# Patient Record
Sex: FEMALE | Employment: FULL TIME | RURAL
[De-identification: names, ages, dates, MRNs, and addresses within clinical notes are randomized per-mention and may not be internally consistent; named-entity substitution may affect disease eponyms.]

---

## 2020-06-12 LAB
ANTIBODY SCREEN, EXTERNAL: NORMAL
CHLAMYDIA, EXTERNAL: NORMAL
GRBS, EXTERNAL: NORMAL
HBSAG, EXTERNAL: NORMAL
HIV, EXTERNAL: NON REACTIVE
N. GONORRHEA, EXTERNAL: NORMAL
RUBELLA, EXTERNAL: NORMAL
T. PALLIDUM, EXTERNAL: NON REACTIVE
TYPE, ABO & RH, EXTERNAL: NORMAL

## 2020-12-07 ENCOUNTER — OFFICE VISIT (OUTPATIENT)
Dept: PRIMARY CARE CLINIC | Age: 45
End: 2020-12-07

## 2020-12-07 DIAGNOSIS — Z01.812 ENCOUNTER FOR PREPROCEDURE SCREENING LABORATORY TESTING FOR COVID-19: Primary | ICD-10-CM

## 2020-12-07 DIAGNOSIS — Z20.822 ENCOUNTER FOR PREPROCEDURE SCREENING LABORATORY TESTING FOR COVID-19: Primary | ICD-10-CM

## 2020-12-07 NOTE — PROGRESS NOTES
Pt presents to the flu clinic today for covid testing. She reports that she is 38 weeks pregnant and her OB/GYN is requesting covid testing prior to delivery. She denies sx or exposure and declined to see a doctor today.  RODO

## 2020-12-09 ENCOUNTER — HOSPITAL ENCOUNTER (INPATIENT)
Age: 45
LOS: 3 days | Discharge: HOME | End: 2020-12-12
Attending: OBSTETRICS & GYNECOLOGY | Admitting: OBSTETRICS & GYNECOLOGY
Payer: COMMERCIAL

## 2020-12-09 LAB
ABO + RH BLD: NORMAL
BASOPHILS # BLD: 0 K/UL (ref 0–0.1)
BASOPHILS NFR BLD: 0 % (ref 0–1)
BLOOD GROUP ANTIBODIES SERPL: NORMAL
DIFFERENTIAL METHOD BLD: ABNORMAL
EOSINOPHIL # BLD: 0.1 K/UL (ref 0–0.4)
EOSINOPHIL NFR BLD: 1 % (ref 0–7)
ERYTHROCYTE [DISTWIDTH] IN BLOOD BY AUTOMATED COUNT: 13 % (ref 11.5–14.5)
HCT VFR BLD AUTO: 34.6 % (ref 35–47)
HGB BLD-MCNC: 11.8 G/DL (ref 11.5–16)
IMM GRANULOCYTES # BLD AUTO: 0 K/UL (ref 0–0.04)
IMM GRANULOCYTES NFR BLD AUTO: 0 % (ref 0–0.5)
LYMPHOCYTES # BLD: 1.9 K/UL (ref 0.8–3.5)
LYMPHOCYTES NFR BLD: 24 % (ref 12–49)
MCH RBC QN AUTO: 29.2 PG (ref 26–34)
MCHC RBC AUTO-ENTMCNC: 34.1 G/DL (ref 30–36.5)
MCV RBC AUTO: 85.6 FL (ref 80–99)
MONOCYTES # BLD: 0.5 K/UL (ref 0–1)
MONOCYTES NFR BLD: 6 % (ref 5–13)
NEUTS SEG # BLD: 5.5 K/UL (ref 1.8–8)
NEUTS SEG NFR BLD: 69 % (ref 32–75)
NRBC # BLD: 0 K/UL (ref 0–0.01)
NRBC BLD-RTO: 0 PER 100 WBC
PLATELET # BLD AUTO: 210 K/UL (ref 150–400)
PMV BLD AUTO: 10.9 FL (ref 8.9–12.9)
RBC # BLD AUTO: 4.04 M/UL (ref 3.8–5.2)
SARS-COV-2, NAA: NOT DETECTED
SPECIMEN EXP DATE BLD: NORMAL
WBC # BLD AUTO: 7.9 K/UL (ref 3.6–11)

## 2020-12-09 PROCEDURE — 59200 INSERT CERVICAL DILATOR: CPT

## 2020-12-09 PROCEDURE — 86900 BLOOD TYPING SEROLOGIC ABO: CPT

## 2020-12-09 PROCEDURE — 36415 COLL VENOUS BLD VENIPUNCTURE: CPT

## 2020-12-09 PROCEDURE — 65410000002 HC RM PRIVATE OB

## 2020-12-09 PROCEDURE — 75410000002 HC LABOR FEE PER 1 HR

## 2020-12-09 PROCEDURE — 85025 COMPLETE CBC W/AUTO DIFF WBC: CPT

## 2020-12-09 RX ORDER — SODIUM CHLORIDE 0.9 % (FLUSH) 0.9 %
5-40 SYRINGE (ML) INJECTION AS NEEDED
Status: DISCONTINUED | OUTPATIENT
Start: 2020-12-09 | End: 2020-12-12 | Stop reason: HOSPADM

## 2020-12-09 RX ORDER — OXYTOCIN/RINGER'S LACTATE 30/500 ML
1 PLASTIC BAG, INJECTION (ML) INTRAVENOUS
Status: DISCONTINUED | OUTPATIENT
Start: 2020-12-10 | End: 2020-12-10

## 2020-12-09 RX ORDER — SODIUM CHLORIDE 0.9 % (FLUSH) 0.9 %
5-40 SYRINGE (ML) INJECTION EVERY 8 HOURS
Status: DISCONTINUED | OUTPATIENT
Start: 2020-12-09 | End: 2020-12-12 | Stop reason: HOSPADM

## 2020-12-09 RX ORDER — OXYTOCIN/RINGER'S LACTATE 30/500 ML
10 PLASTIC BAG, INJECTION (ML) INTRAVENOUS AS NEEDED
Status: DISCONTINUED | OUTPATIENT
Start: 2020-12-09 | End: 2020-12-10

## 2020-12-09 RX ORDER — OXYTOCIN/RINGER'S LACTATE 30/500 ML
87.3 PLASTIC BAG, INJECTION (ML) INTRAVENOUS AS NEEDED
Status: DISCONTINUED | OUTPATIENT
Start: 2020-12-09 | End: 2020-12-12 | Stop reason: HOSPADM

## 2020-12-09 RX ORDER — GUAIFENESIN 100 MG/5ML
81 LIQUID (ML) ORAL DAILY
COMMUNITY

## 2020-12-09 RX ORDER — SODIUM CHLORIDE, SODIUM LACTATE, POTASSIUM CHLORIDE, CALCIUM CHLORIDE 600; 310; 30; 20 MG/100ML; MG/100ML; MG/100ML; MG/100ML
125 INJECTION, SOLUTION INTRAVENOUS CONTINUOUS
Status: DISCONTINUED | OUTPATIENT
Start: 2020-12-09 | End: 2020-12-10

## 2020-12-09 RX ORDER — NALOXONE HYDROCHLORIDE 0.4 MG/ML
0.4 INJECTION, SOLUTION INTRAMUSCULAR; INTRAVENOUS; SUBCUTANEOUS AS NEEDED
Status: DISCONTINUED | OUTPATIENT
Start: 2020-12-09 | End: 2020-12-10

## 2020-12-09 RX ADMIN — Medication 10 ML: at 20:23

## 2020-12-09 NOTE — H&P
History & Physical    Name: Jordan Serrano MRN: [id number]  SSN: [id number]    YOB: 1975  Age: 39 y.o. Sex: female      Subjective:     Estimated Date of Delivery: 20  OB History    Para Term  AB Living   3 1 1   1 1   SAB TAB Ectopic Molar Multiple Live Births   1         1      # Outcome Date GA Lbr Sanchez/2nd Weight Sex Delivery Anes PTL Lv   3 Current            2 SAB 17           1 Term 14 40w4d 06:29 / 00:33 3.435 kg M Tiff Gray is a 40 yo female admitted with pregnancy at 38w4d for induction of labor due to SGA. Patient has been followed for SGA. Last growth US - EFW 14%, HC<1%, elevated S/D ratio but no AEDF. US today- vtx, MARY JANE=6.2, S/D ratio elevated but improved and no absent AEDF. Dr Iona Rausch had recommended indx between 38-39 wks. Pregnancy c/b  1. APA and AMA-patient is 45  2. GBS pos urine  3. CKC prior to last term     Past Medical History:   Diagnosis Date    Abnormal Papanicolaou smear of cervix     previous to pregancy, resolved    Phlebitis and thrombophlebitis      Past Surgical History:   Procedure Laterality Date    HX OTHER SURGICAL      LEEP, wisdom teeth     Social History     Occupational History    Not on file   Tobacco Use    Smoking status: Never Smoker    Smokeless tobacco: Never Used   Substance and Sexual Activity    Alcohol use: Not Currently     Comment: socially    Drug use: Never    Sexual activity: Yes     No family history on file. No Known Allergies  Prior to Admission medications    Medication Sig Start Date End Date Taking? Authorizing Provider   PNV BW.19/BKXORKQ fum/folic ac (PRENATAL PO) Take  by mouth. Yes Provider, Historical   aspirin 81 mg chewable tablet Take 81 mg by mouth daily.    Yes Provider, Historical   methylPREDNISolone (MEDROL, RON,) 4 mg tablet Take as directed 2/15/20   Mike Johnson MD   ibuprofen (MOTRIN) 800 mg tablet Take 1 tablet by mouth every eight (8) hours. 9/7/14   Kelly Baker MD        Review of Systems: A comprehensive review of systems was negative except for that written in the History of Present Illness. Objective:     Vitals:  Vitals:    12/09/20 1539   Weight: 80.3 kg (177 lb)   Height: 5' 9\" (1.753 m)        Physical Exam:  Patient without distress. Heart: Regular rate and rhythm  Lung: clear to auscultation throughout lung fields, no wheezes, no rales, no rhonchi and normal respiratory effort  Abdomen: soft, nontender  Membranes:  Intact  Fetal Heart Rate: Reactive        Cervix- 1/50/-3 vtx  Prenatal Labs:   Lab Results   Component Value Date/Time    Rubella, External Nonreactive 02/21/2014    HBsAg, External Negative 02/21/2014    HIV, External Nonreactive 02/21/2014    RPR, External Non reactive 02/21/2014    Gonorrhea, External NEG 02/21/2014    Chlamydia, External NEG 02/21/2014    ABO,Rh O pos 02/21/2014    GrBStrep, External negative 08/12/2014       Impression/Plan:     Active Problems:    SGA (small for gestational age) (12/9/2020)         Plan: Admit for induction of labor.  Group B Strep positive, will treat prophylactically with penicillin  -cbc, STBB  -Plan cervical ripening overnight with CRB and Pitocin in am

## 2020-12-09 NOTE — PROGRESS NOTES
1510: Pt ambulated onto unit for scheduled induction. Pt denies bleeding, LOF, headache, or blurry vision. Pt reports positive fetal movement. 1555: Dr. Sanju Ferguson at bedside to discuss plan of care and evalute pt. SVE by Dr. Sanju Ferguson pt 1cm. Cervical ripening balloon placed at this time. 1626: RN reviewed fetal tracing strip with Dr. Sanju Ferguson. MD states to keep pt on for a few more minutes. 1657: RN reviewed strip with MD. MD states pt ok to come off monitor. 1900: Bedside shift change report given to Orlin Porras RN (oncoming nurse) by SYD Melara RN (offgoing nurse). Report included the following information SBAR and Kardex.

## 2020-12-09 NOTE — PROGRESS NOTES
20 1617   Cervical Exam   Dilation (cm) 1   Eff 50 %   Station -3   Baby Position Vertex    at 38 4/7 wks indxn for SGA.  Cook cervical ripening balloon placed using sterile technique and both balloons inflated with 60 cc  -remove CRB in 12 hours if still in place  -start Pitocin in am  -Pcn for gbs  -CEFM in labor, intermittent monitoring overnight as long a FHR tracing remains Category 1

## 2020-12-10 PROCEDURE — 74011250636 HC RX REV CODE- 250/636: Performed by: OBSTETRICS & GYNECOLOGY

## 2020-12-10 PROCEDURE — 75410000002 HC LABOR FEE PER 1 HR

## 2020-12-10 PROCEDURE — 2709999900 HC NON-CHARGEABLE SUPPLY

## 2020-12-10 PROCEDURE — 74011000258 HC RX REV CODE- 258: Performed by: OBSTETRICS & GYNECOLOGY

## 2020-12-10 PROCEDURE — 10907ZC DRAINAGE OF AMNIOTIC FLUID, THERAPEUTIC FROM PRODUCTS OF CONCEPTION, VIA NATURAL OR ARTIFICIAL OPENING: ICD-10-PCS | Performed by: OBSTETRICS & GYNECOLOGY

## 2020-12-10 PROCEDURE — 0KQM0ZZ REPAIR PERINEUM MUSCLE, OPEN APPROACH: ICD-10-PCS | Performed by: OBSTETRICS & GYNECOLOGY

## 2020-12-10 PROCEDURE — 77010026064 HC OXYGEN INFANT MED AIR MIN

## 2020-12-10 PROCEDURE — 77010026065 HC OXYGEN MINIMUM MEDICAL AIR

## 2020-12-10 PROCEDURE — 74011250637 HC RX REV CODE- 250/637: Performed by: OBSTETRICS & GYNECOLOGY

## 2020-12-10 PROCEDURE — 75410000000 HC DELIVERY VAGINAL/SINGLE

## 2020-12-10 PROCEDURE — 65410000002 HC RM PRIVATE OB

## 2020-12-10 PROCEDURE — 77030031139 HC SUT VCRL2 J&J -A

## 2020-12-10 PROCEDURE — 75410000003 HC RECOV DEL/VAG/CSECN EA 0.5 HR

## 2020-12-10 RX ORDER — ONDANSETRON 4 MG/1
4 TABLET, ORALLY DISINTEGRATING ORAL
Status: ACTIVE | OUTPATIENT
Start: 2020-12-10 | End: 2020-12-11

## 2020-12-10 RX ORDER — ACETAMINOPHEN 325 MG/1
650 TABLET ORAL
Status: DISCONTINUED | OUTPATIENT
Start: 2020-12-10 | End: 2020-12-12 | Stop reason: HOSPADM

## 2020-12-10 RX ORDER — NALOXONE HYDROCHLORIDE 0.4 MG/ML
0.4 INJECTION, SOLUTION INTRAMUSCULAR; INTRAVENOUS; SUBCUTANEOUS AS NEEDED
Status: DISCONTINUED | OUTPATIENT
Start: 2020-12-10 | End: 2020-12-12 | Stop reason: HOSPADM

## 2020-12-10 RX ORDER — IBUPROFEN 400 MG/1
800 TABLET ORAL EVERY 8 HOURS
Status: DISCONTINUED | OUTPATIENT
Start: 2020-12-10 | End: 2020-12-12 | Stop reason: HOSPADM

## 2020-12-10 RX ORDER — OXYCODONE AND ACETAMINOPHEN 5; 325 MG/1; MG/1
1 TABLET ORAL
Status: DISCONTINUED | OUTPATIENT
Start: 2020-12-10 | End: 2020-12-12 | Stop reason: HOSPADM

## 2020-12-10 RX ORDER — LIDOCAINE HYDROCHLORIDE 10 MG/ML
INJECTION, SOLUTION EPIDURAL; INFILTRATION; INTRACAUDAL; PERINEURAL
Status: DISCONTINUED
Start: 2020-12-10 | End: 2020-12-10

## 2020-12-10 RX ORDER — OXYTOCIN/RINGER'S LACTATE 30/500 ML
87.3 PLASTIC BAG, INJECTION (ML) INTRAVENOUS AS NEEDED
Status: COMPLETED | OUTPATIENT
Start: 2020-12-10 | End: 2020-12-10

## 2020-12-10 RX ORDER — OXYTOCIN/RINGER'S LACTATE 30/500 ML
10 PLASTIC BAG, INJECTION (ML) INTRAVENOUS AS NEEDED
Status: COMPLETED | OUTPATIENT
Start: 2020-12-10 | End: 2020-12-10

## 2020-12-10 RX ORDER — DOCUSATE SODIUM 100 MG/1
100 CAPSULE, LIQUID FILLED ORAL
Status: DISCONTINUED | OUTPATIENT
Start: 2020-12-10 | End: 2020-12-12 | Stop reason: HOSPADM

## 2020-12-10 RX ORDER — DIPHENHYDRAMINE HCL 25 MG
25 CAPSULE ORAL
Status: DISCONTINUED | OUTPATIENT
Start: 2020-12-10 | End: 2020-12-12 | Stop reason: HOSPADM

## 2020-12-10 RX ORDER — SIMETHICONE 80 MG
80 TABLET,CHEWABLE ORAL
Status: DISCONTINUED | OUTPATIENT
Start: 2020-12-10 | End: 2020-12-12 | Stop reason: HOSPADM

## 2020-12-10 RX ORDER — HYDROCORTISONE ACETATE PRAMOXINE HCL 2.5; 1 G/100G; G/100G
CREAM TOPICAL AS NEEDED
Status: DISCONTINUED | OUTPATIENT
Start: 2020-12-10 | End: 2020-12-12 | Stop reason: HOSPADM

## 2020-12-10 RX ADMIN — SODIUM CHLORIDE, SODIUM LACTATE, POTASSIUM CHLORIDE, AND CALCIUM CHLORIDE 999 ML/HR: 600; 310; 30; 20 INJECTION, SOLUTION INTRAVENOUS at 14:12

## 2020-12-10 RX ADMIN — OXYTOCIN 1 MILLI-UNITS/MIN: 10 INJECTION, SOLUTION INTRAMUSCULAR; INTRAVENOUS at 06:20

## 2020-12-10 RX ADMIN — OXYTOCIN 10000 MILLI-UNITS: 10 INJECTION, SOLUTION INTRAMUSCULAR; INTRAVENOUS at 15:49

## 2020-12-10 RX ADMIN — IBUPROFEN 800 MG: 400 TABLET, FILM COATED ORAL at 17:39

## 2020-12-10 RX ADMIN — SODIUM CHLORIDE 5 MILLION UNITS: 900 INJECTION INTRAVENOUS at 00:27

## 2020-12-10 RX ADMIN — OXYTOCIN 87.3 MILLI-UNITS/MIN: 10 INJECTION, SOLUTION INTRAMUSCULAR; INTRAVENOUS at 16:10

## 2020-12-10 RX ADMIN — Medication 10 ML: at 00:26

## 2020-12-10 RX ADMIN — SODIUM CHLORIDE 2.5 MILLION UNITS: 9 INJECTION, SOLUTION INTRAVENOUS at 07:41

## 2020-12-10 RX ADMIN — SODIUM CHLORIDE 2.5 MILLION UNITS: 9 INJECTION, SOLUTION INTRAVENOUS at 04:06

## 2020-12-10 RX ADMIN — SODIUM CHLORIDE, SODIUM LACTATE, POTASSIUM CHLORIDE, AND CALCIUM CHLORIDE 125 ML/HR: 600; 310; 30; 20 INJECTION, SOLUTION INTRAVENOUS at 06:16

## 2020-12-10 RX ADMIN — SODIUM CHLORIDE 2.5 MILLION UNITS: 9 INJECTION, SOLUTION INTRAVENOUS at 11:39

## 2020-12-10 RX ADMIN — Medication 10 ML: at 04:06

## 2020-12-10 NOTE — PROGRESS NOTES
1350: Variable decels noted, turned to L  Lateral, SVE- 6 cm. Dr. Gene Chiang ordered to reduce pitocin. 1358: Dr. Gene Chiang at bedside. US performed- fluid looks ok. Fluid bolus given, O2 applied, and pitocin decreased.

## 2020-12-10 NOTE — PROGRESS NOTES
Asked by Dr Fito Stanley to evaluate patient as she is attending another patient for delivery. Pt with variable decelerations with moderate variability representing a class 2 tracing. Just checked by nursing 20 min ago and found to be 6 cm. MARY JANE= 7.7, Vtx. Position change, decrease of Pitocin, IVF bolus, and O2 initiated. Pt has good spontaneous accelerations. Will recheck cervix in 15 minutes.

## 2020-12-10 NOTE — PROGRESS NOTES
In to see patient  Doing well  Visit Vitals  /75   Pulse 86   Temp 98.8 °F (37.1 °C)   Resp 18   Ht 5' 9\" (1.753 m)   Wt 80.3 kg (177 lb)   LMP  (Exact Date)   SpO2 100%   BMI 26.14 kg/m²     , cat I  toco q 3 min  SVE 5/60/-1 AROM clear fluid    A/P:  40 yo  @ 38w4d IOL SGA and elevated dopplers  - IOL - continue pitocin, sp AROM, hoping to avoid epidural  - FSR/vtx/gbs pos on pcn/efw 5lb8oz on  (14%)

## 2020-12-10 NOTE — PROGRESS NOTES
370- received oncoming sbar report    [phone]- pt up to chair    0740- m readjusted     0839- dr. Hillery Spatz in. Strip reviewed. sve done with arom    1305- dr. Hillery Spatz in . Strip reviewed. No new orders received    1514- dr. Hillery Spatz in. Strip reviewed. No new orders received. 1530- pt sts urge to push, dr. Hillery Spatz called to bedside, sve done. Pt complete. Start pushing    1542-  of live female infant      1715-pt up to br, voided without difficulty. Gait steady, regina care taught and done. 1800- sbar report to deb. rm    [phone]- pt up to br and voided without difficulty.  Regina care done, iv removed, tip intact

## 2020-12-10 NOTE — PROGRESS NOTES
Tracing has improved with countermeasures having less deep variables that are less frequent. Currently laboring in knee chest.Cx is 6/80/-1. Nice acceleration with exam/scalp stim. Will continue to observe.

## 2020-12-10 NOTE — PROGRESS NOTES
Labor Progress Note  Patient seen, fetal heart rate and contraction pattern evaluated. Patient reports some cramping, small bleeding; no ROM  Baby remains active. Physical Exam:  Visit Vitals  /70   Pulse 90   Temp 98.4 °F (36.9 °C)   Resp 16   Ht 5' 9\" (1.753 m)   Wt 80.3 kg (177 lb)   LMP  (Exact Date)   SpO2 100%   BMI 26.14 kg/m²     Cervical Exam: 5/60/-2/ballotable  Membranes:  intact  Uterine Activity: occasional  Fetal Heart Rate: 130,  adequate variability and reactivity  Accelerations: yes  Decelerations: none    Assessment/Plan:  Reassuring fetal status. Adequate response to balloon placement  Start pitocin  Analgesia as indicated  Discussed with patient; questions answered    BARRY Box MD

## 2020-12-10 NOTE — PROGRESS NOTES
1905: Bedside shift change report given to [address] [place] [address] (oncoming nurse) by SYD Pacheco RN (offgoing nurse). Report included the following information SBAR.     0410: Uterine cath removed by [address] [place] [address]. Pt tolerated well. 0457: Pt up to shower. 3438: SVE performed by Dr. Pasha Horvath, 5cm/60/-2. Orders received to start Pitocin. 0720: Bedside shift change report given to ISAAC Martini RN (oncoming nurse) by [address] [place] [address] (offgoing nurse). Report included the following information SBAR.

## 2020-12-10 NOTE — L&D DELIVERY NOTE
Delivery Summary  Patient: Dante Pak             Circumcision:   NA-female  Additional Delivery Comments - IOL at 38+ weeks due to SGA fetus with abnormal dopplers. Cook balloon-->pit-->AROM. Labored on normal curve without epidural. Labored in hands and knees due to variable decels and ultimately pushed and delivered in hands and knees position as well. Baby delivered in OA position with easy delivery of shoulders and remainder of body. Infant was placed on bed in front of mother and cord clamping delayed approximately 80s. Spontaneous placenta within 5 min. There was a second degree laceration. After 10cc of lidocaine were instilled, the 2nd degree was repaired with 2-0 vicryl in the usual fashion. EBL 200cc  Name \"Sandy Souza\"        Information for the patient's :  Laurita Marks [[id number]]       Labor Events:    Labor: No    Steroids: None   Cervical Ripening Date/Time: 2020 3:55 PM   Cervical Ripening Type: May/EASI   Antibiotics During Labor: Yes   Rupture Identifier:      Rupture Date/Time: 12/10/2020 8:39 AM   Rupture Type: AROM   Amniotic Fluid Volume:  Moderate    Amniotic Fluid Description: Clear    Amniotic Fluid Odor: None    Induction: Oxytocin;AROM       Induction Date/Time: 12/10/2020 6:20 AM    Indications for Induction: Other(comment)    Augmentation: None   Augmentation Date/Time:      Indications for Augmentation:     Labor complications: None       Additional complications:        Delivery Events:  Indications For Episiotomy:     Episiotomy: None   Perineal Laceration(s): 2nd   Repaired: Yes   Periurethral Laceration Location:      Repaired:     Labial Laceration Location:     Repaired:     Sulcal Laceration Location:     Repaired:     Vaginal Laceration Location:     Repaired:     Cervical Laceration Location:     Repaired:     Repair Suture: Vicryl 3-0   Number of Repair Packets: 1   Estimated Blood Loss (ml):  ml   Quantitative Blood Loss (ml) Delivery Date: 12/10/2020    Delivery Time: 3:42 PM  Delivery Type: Vaginal, Spontaneous  Sex:  Female    Gestational Age: 44w7d   Delivery Clinician:  Yossi Navas  Living Status: Living   Delivery Location: L&D 315          APGARS  One minute Five minutes Ten minutes   Skin color: 0   0        Heart rate: 2   2        Grimace: 2   2        Muscle tone: 2   2        Breathin   2        Totals: 8   8            Presentation: Vertex    Position:        Resuscitation Method:  Tactile Stimulation;Suctioning-deep; Oxygen;C-PAP;Suctioning-bulb     Meconium Stained: None      Cord Information: 3 Vessels  Complications: None  Cord around:    Delayed cord clamping?  Yes  Cord clamped date/time:12/10/2020  3:43 PM  Disposition of Cord Blood: Lab    Blood Gases Sent?: No    Placenta:  Date/Time: 12/10/2020  3:49 PM  Removal: Spontaneous      Appearance: Intact      Measurements:  Birth Weight: 2.91 kg      Birth Length: 47 cm      Head Circumference: 33 cm      Chest Circumference: 33 cm     Abdominal Girth: 33 cm    Other Providers:   BRIAN NAVAS;LEATHA CALI;;;;;;;;MOLLY AYOUB;ELEAZAR ROJAS;RICH WARREN;EVAN RUST, Obstetrician;Primary Nurse;Primary  Nurse;Nicu Nurse;Neonatologist;Anesthesiologist;Crna;Nurse Practitioner;Midwife;Nursery Nurse;Nursery Nurse;Charge Nurse;Scrub Tech           Cord pH:  none    Episiotomy: None   Laceration(s): 2nd     Estimated Blood Loss (ml):     Labor Events  Method: Oxytocin;AROM      Augmentation: None   Cervical Ripenin2020  3:55 PM  [facility] Problems  Date Reviewed: 2014          Codes Class Noted POA    SGA (small for gestational age) ICD-10-CM: P0.11  ICD-9-CM: 764.00  2020 Unknown              Operative Vaginal Delivery - none    Group B Strep:   Lab Results   Component Value Date/Time    GrBStrep, External positive in urine 2020     Information for the patient's :  Anne-Marie Klein [[id number]]   No results found for: ABORH, PCTABR, PCTDIG, BILI, ABORHEXT, ABORH     No results found for: APH, APCO2, APO2, AHCO3, ABEC, ABDC, O2ST, EPHV, PCO2V, PO2V, HCO3V, EBEV, EBDV, SITE, RSCOM

## 2020-12-11 PROCEDURE — 74011250637 HC RX REV CODE- 250/637: Performed by: OBSTETRICS & GYNECOLOGY

## 2020-12-11 PROCEDURE — 65410000002 HC RM PRIVATE OB

## 2020-12-11 RX ADMIN — IBUPROFEN 800 MG: 400 TABLET, FILM COATED ORAL at 19:46

## 2020-12-11 RX ADMIN — IBUPROFEN 800 MG: 400 TABLET, FILM COATED ORAL at 11:56

## 2020-12-11 RX ADMIN — IBUPROFEN 800 MG: 400 TABLET, FILM COATED ORAL at 03:03

## 2020-12-11 NOTE — ROUTINE PROCESS
1800 Bedside and Verbal shift change report given to DESI Murphy RN (oncoming nurse) by Carlos Mckeon RN (offgoing nurse). Report included the following information SBAR, Intake/Output and MAR.

## 2020-12-11 NOTE — LACTATION NOTE
This note was copied from a baby's chart. P2 mother  AGA female \"Sandy\" phenotypic diagnosis of Downs Syndrome delivered at Gestational Age: 44w7d  [facility] [facility] support offered and accepted. Awareness of low muscle tone for nursing and positioning reviewed. Pumping options discussed and accepted. Sized for 27 mm flanges. Pump suction to level of comfort. 1.5 ml expressed. Manual massage, compression and expression of milk instructed to lead each feeding. Benefits of increasing milk production as well as milk transfer to baby with this low tech but highly effective stimulation process reviewed. Provided [employer] handout to parents. Reviewed positioning/chin and or cheek support and pacing with needed breaks. Assisted in finger/syringe feeding 1.5 ml of ebm. Slow and uncoordinated suckle with repeated attempts to form seal. Mother's choice to syringe feed this first feeding. Duration of feeding 7 minutes. Diaper change to waken baby. May try bottle nipple feeding to advance as volumes increase. Continue to provide skin to skin, at breast efforts encouraged if baby eager,and continue to pump q2-3 hours. Breast Assessment  Left Breast: Medium  Left Nipple: Intact, Everted  Right Breast: Medium  Right Nipple: Everted, Intact  Breast- Feeding Assessment  Attends Breast-Feeding Classes: No  Breast-Feeding Experience: Yes  Breast Trauma/Surgery: No  Type/Quality: Good  Lactation Consultant Visits  Breast-Feedings: Fair     LATCH Documentation  Latch: Repeated attempts, hold nipple in mouth, stimulate to suck  Audible Swallowing: A few with stimulation  Type of Nipple: Everted (after stimulation)  Comfort (Breast/Nipple): Soft/non-tender  Hold (Positioning): No assist from staff, mother able to position/hold infant  LATCH Score: 8 previously recorded. Anahi Marie, NNP IBCLC for outpatient follow up and support.  Mother has new Spectra S2 model breast pump for ongoing milk supply. [facility] # provided.    Call prn

## 2020-12-11 NOTE — ROUTINE PROCESS
Bedside and Verbal shift change report given to CLARK Walls RN (oncoming nurse) by Michael Walker. Fer Garcia RN (offgoing nurse). Report included the following information SBAR, Intake/Output and MAR.

## 2020-12-11 NOTE — PROGRESS NOTES
Post-Partum Day Number 1 Progress Note    [employer]     Assessment: Doing well, post partum day 1 s/p . Infant with suspected Downs. Plan:  1. Continue routine postpartum and perineal care as well as maternal education. 2. N/A     Information for the patient's :  Alyx Franco [[id number]]   Vaginal, Spontaneous    Patient doing well without significant complaint. Voiding without difficulty, normal lochia. Vitals:  Visit Vitals  /63 (BP 1 Location: Right arm, BP Patient Position: At rest)   Pulse 86   Temp 98.3 °F (36.8 °C)   Resp 16   Ht 5' 9\" (1.753 m)   Wt 80.3 kg (177 lb)   LMP  (Exact Date)   SpO2 95%   Breastfeeding Unknown   BMI 26.14 kg/m²     Temp (24hrs), Av.6 °F (37 °C), Min:98.1 °F (36.7 °C), Max:99.2 °F (37.3 °C)        Exam:   Patient without distress. Abdomen soft, fundus firm, nontender                Perineum with normal lochia noted. Lower extremities are negative for swelling, cords or tenderness. Labs:     Lab Results   Component Value Date/Time    WBC 7.9 2020 03:51 PM    WBC 6.1 2014 05:40 PM    HGB 11.8 2020 03:51 PM    HGB 11.8 2014 05:40 PM    HCT 34.6 (L) 2020 03:51 PM    HCT 34.9 (L) 2014 05:40 PM    PLATELET 541  03:51 PM    PLATELET 265  05:40 PM       No results found for this or any previous visit (from the past 24 hour(s)).

## 2020-12-12 VITALS
BODY MASS INDEX: 26.22 KG/M2 | SYSTOLIC BLOOD PRESSURE: 123 MMHG | HEIGHT: 69 IN | OXYGEN SATURATION: 95 % | DIASTOLIC BLOOD PRESSURE: 81 MMHG | WEIGHT: 177 LBS | HEART RATE: 85 BPM | TEMPERATURE: 98.5 F | RESPIRATION RATE: 16 BRPM

## 2020-12-12 PROCEDURE — 74011250637 HC RX REV CODE- 250/637: Performed by: OBSTETRICS & GYNECOLOGY

## 2020-12-12 RX ORDER — IBUPROFEN 800 MG/1
800 TABLET ORAL EVERY 8 HOURS
Qty: 30 TAB | Refills: 1 | Status: SHIPPED | OUTPATIENT
Start: 2020-12-12

## 2020-12-12 RX ADMIN — IBUPROFEN 800 MG: 400 TABLET, FILM COATED ORAL at 14:49

## 2020-12-12 RX ADMIN — IBUPROFEN 800 MG: 400 TABLET, FILM COATED ORAL at 04:50

## 2020-12-12 RX ADMIN — ACETAMINOPHEN 650 MG: 325 TABLET ORAL at 01:09

## 2020-12-12 NOTE — PROGRESS NOTES
Bedside shift change report given to DAVID Joel (oncoming nurse) by DAVID Melendez RN (offgoing nurse). Report included the following information SBAR.

## 2020-12-12 NOTE — DISCHARGE INSTRUCTIONS
POST DELIVERY DISCHARGE INSTRUCTIONS    Name: Fabiola Garcia  YOB: 1975  Primary Diagnosis: Active Problems:    SGA (small for gestational age) (2020)        General:     Diet/Diet Restrictions:  · Eight 8-ounce glasses of fluid daily (water, juices); avoid excessive caffeine intake. · Meals/snacks as desired which are high in fiber and carbohydrates and low in fat and cholesterol. Medications:         Physical Activity / Restrictions / Safety:     · Avoid heavy lifting, no more that 8 lbs. For 2-3 weeks;   · Limit use of stairs to 2 times daily for the first week home. · No driving for one week. · Avoid intercourse 4-6 weeks, no douching or tampon use. · Check with obstetrician before starting or resuming an exercise program.      Discharge Instructions/Special Treatment/Home Care Needs:     · Continue prenatal vitamins. · Continue to use squirt bottle with warm water on your episiotomy after each bathroom use until bleeding stops. · If steri-strips applied to your incision, remove in 7-10 days. Call your doctor for the following:     · Fever over 101 degrees by mouth. · Vaginal bleeding heavier than a normal menstrual period or clots larger than a golf ball. · Red streaks or increased swelling of legs, painful red streaks on your breast.  · Painful urination, constipation and increased pain or swelling or discharge with your incision. · If you feel extremely anxious or overwhelmed. · If you have thoughts of harming yourself and/or your baby. Pain Management:     · Take Acetaminophen (Tylenol) or Ibuprofen (Advil, Motrin), as directed for pain. · Use a warm Sitz bath 3 times daily to relieve episiotomy or hemorrhoidal discomfort. · For hemorrhoidal discomfort, use Tucks and Anusol cream as needed and directed. · Heating pad to  incision as needed.      Follow-Up Care:     Appointment with MD:   Follow-up Appointments   Procedures    FOLLOW UP VISIT Appointment in: 6 Weeks With Dr. Nikolai Laughlin for postpartum check     With Dr. Nikolai Laughlin for postpartum check     Standing Status:   Standing     Number of Occurrences:   1     Order Specific Question:   Appointment in     Answer:   Dominick Gutierrez     Telephone number: [phone]    Signed By: Khadijah Espinoza MD                                                                                                   Date: 2020 Time: 10:59 AMPatient Education        After Your Delivery (the Postpartum Period): Care Instructions  Your Care Instructions     Congratulations on the birth of your baby. Like pregnancy, the  period can be a time of excitement, erica, and exhaustion. You may look at your wondrous little baby and feel happy. You may also be overwhelmed by your new sleep hours and new responsibilities. At first, babies often sleep during the days and are awake at night. They do not have a pattern or routine. They may make sudden gasps, jerk themselves awake, or look like they have crossed eyes. These are all normal, and they may even make you smile. In these first weeks after delivery, try to take good care of yourself. It may take 4 to 6 weeks to feel like yourself again, and possibly longer if you had a  birth. You will likely feel very tired for several weeks. Your days will be full of ups and downs, but lots of erica as well. Follow-up care is a key part of your treatment and safety. Be sure to make and go to all appointments, and call your doctor if you are having problems. It's also a good idea to know your test results and keep a list of the medicines you take. How can you care for yourself at home? Take care of your body after delivery  · Use pads instead of tampons for the bloody flow that may last as long as 2 weeks. · Ease cramps with ibuprofen (Advil, Motrin). · Ease soreness of hemorrhoids and the area between your vagina and rectum with ice compresses or witch hazel pads.   · Ease constipation by drinking lots of fluid and eating high-fiber foods. Ask your doctor about over-the-counter stool softeners. · Cleanse yourself with a gentle squeeze of warm water from a bottle instead of wiping with toilet paper. · Take a sitz bath in warm water several times a day. · Wear a good nursing bra. Ease sore and swollen breasts with warm, wet washcloths. · If you are not breastfeeding, use ice rather than heat for breast soreness. · Your period may not start for several months if you are breastfeeding. You may bleed more, and longer at first, than you did before you got pregnant. · Wait until you are healed (about 4 to 6 weeks) before you have sexual intercourse. Your doctor will tell you when it is okay to have sex. · Try not to travel with your baby for 5 or 6 weeks. If you take a long car trip, make frequent stops to walk around and stretch. Avoid exhaustion  · Rest every day. Try to nap when your baby naps. · Ask another adult to be with you for a few days after delivery. · Plan for  if you have other children. · Stay flexible so you can eat at odd hours and sleep when you need to. Both you and your baby are making new schedules. · Plan small trips to get out of the house. Change can make you feel less tired. · Ask for help with housework, cooking, and shopping. Remind yourself that your job is to care for your baby. Know about help for postpartum depression  · \"Baby blues\" are common for the first 1 to 2 weeks after birth. You may cry or feel sad or irritable for no reason. · Rest whenever you can. Being tired makes it harder to handle your emotions. · Go for walks with your baby. · Talk to your partner, friends, and family about your feelings. · If your symptoms last for more than a few weeks, or if you feel very depressed, ask your doctor for help. · Postpartum depression can be treated. Support groups and counseling can help. Sometimes medicine can also help.   Stay healthy  · Eat healthy foods so you have more energy and lose extra baby pounds. · If you breastfeed, avoid drugs. If you quit smoking during pregnancy, try to stay smoke-free. If you choose to have a drink now and then, have only one drink, and limit the number of occasions that you have a drink. Wait to breastfeed at least 2 hours after you have a drink to reduce the amount of alcohol the baby may get in the milk. · Start daily exercise after 4 to 6 weeks, but rest when you feel tired. · Learn exercises to tone your belly. Do Kegel exercises to regain strength in your pelvic muscles. You can do these exercises while you stand or sit. ? Squeeze the same muscles you would use to stop your urine. Your belly and thighs should not move. ? Hold the squeeze for 3 seconds, and then relax for 3 seconds. ? Start with 3 seconds. Then add 1 second each week until you are able to squeeze for 10 seconds. ? Repeat the exercise 10 to 15 times for each session. Do three or more sessions each day. · Find a class for new mothers and new babies that has an exercise time. · If you had a  birth, give yourself a bit more time before you exercise, and be careful. When should you call for help? Call  911 anytime you think you may need emergency care. For example, call if:    · You have thoughts of harming yourself, your baby, or another person.     · You passed out (lost consciousness).     · You have chest pain, are short of breath, or cough up blood.     · You have a seizure. Call your doctor now or seek immediate medical care if:    · You have severe vaginal bleeding.  This means you are passing blood clots and soaking through a pad each hour for 2 or more hours.     · You are dizzy or lightheaded, or you feel like you may faint.     · You have a fever.     · You have new or more belly pain.     · You have signs of a blood clot in your leg (called a deep vein thrombosis), such as:  ? Pain in the calf, back of the knee, thigh, or groin.  ? Redness and swelling in your leg or groin.     · You have signs of preeclampsia, such as:  ? Sudden swelling of your face, hands, or feet. ? New vision problems (such as dimness, blurring, or seeing spots). ? A severe headache. Watch closely for changes in your health, and be sure to contact your doctor if:    · Your vaginal bleeding seems to be getting heavier.     · You have new or worse vaginal discharge.     · You feel sad, anxious, or hopeless for more than a few days.     · You do not get better as expected. Where can you learn more? Go to http://www.virk.com/  Enter A461 in the search box to learn more about \"After Your Delivery (the Postpartum Period): Care Instructions. \"  Current as of: February 11, 2020               Content Version: 12.6  © 7535-2491 [employer]. Care instructions adapted under license by [employer] (which disclaims liability or warranty for this information). If you have questions about a medical condition or this instruction, always ask your healthcare professional. [place] [address] any warranty or liability for your use of this information.

## 2020-12-12 NOTE — LACTATION NOTE
This note was copied from a baby's chart. Enc to awaken infant and feed and to continue feeding infant at least every three hours through the noc. Offered to assist/observe breast feeding. Mother declines offer.

## 2020-12-12 NOTE — DISCHARGE SUMMARY
Obstetrical Discharge Summary     Name: Daxa Sanchez MRN: [id number]  SSN: [id number]    YOB: 1975  Age: 39 y.o. Sex: female      Admit Date: 2020    Discharge Date: 2020     Admitting Physician: Nicole Bishop MD     Attending Physician:  Hans Bedoya MD     Admission Diagnoses: SGA (small for gestational age) [P05.10]    Discharge Diagnoses:   Information for the patient's :  Dale Dyson [[id number]]   Delivery of a 2.91 kg female infant via Vaginal, Spontaneous on 12/10/2020 at 3:42 PM  by Rudolpho Standard. Apgars were 8  and 8 . Additional Diagnoses:   Hospital Problems  Date Reviewed: 2014          Codes Class Noted POA    SGA (small for gestational age) ICD-8-CM: P0.11  ICD-9-CM: 764.00  2020 Unknown             Lab Results   Component Value Date/Time    Rubella, External immune 4.09 2020    GrBStrep, External positive in urine 2020       Hospital Course: Normal hospital course following the delivery. Disposition at Discharge: Home or self care    Discharged Condition: Stable    Patient Instructions:   Current Discharge Medication List      CONTINUE these medications which have CHANGED    Details   ibuprofen (MOTRIN) 800 mg tablet Take 1 Tab by mouth every eight (8) hours. Qty: 30 Tab, Refills: 1         CONTINUE these medications which have NOT CHANGED    Details   PNV PD.95/SGJKETN fum/folic ac (PRENATAL PO) Take  by mouth. aspirin 81 mg chewable tablet Take 81 mg by mouth daily. methylPREDNISolone (MEDROL, RON,) 4 mg tablet Take as directed  Qty: 1 Dose Pack, Refills: 0             Reference my discharge instructions.     Follow-up Appointments   Procedures    FOLLOW UP VISIT Appointment in: 6 Weeks With Dr. Nain Carrillo for postpartum check     With Dr. Nain Carrillo for postpartum check     Standing Status:   Standing     Number of Occurrences:   1     Order Specific Question:   Appointment in     Answer:   6 Weeks        Signed By:  Vick Carcamo MD     December 12, 2020

## 2020-12-12 NOTE — PROGRESS NOTES
Post-Partum Day Number 2 Progress Note    Lilibeth Cerna     Assessment: Doing well, post partum day 2    Plan:   1. Discharge home today  2. Follow up in office in 6 weeks with Dr. Sarah Zamarripa  3. Post partum activity advised, diet as tolerated  4. Discharge Medications: ibuprofen and medications prior to admission    Information for the patient's :  Darcie Simms [[id number]]   Vaginal, Spontaneous    Patient doing well without significant complaint. Voiding without difficulty, normal lochia. Vitals:  Visit Vitals  /81   Pulse 85   Temp 98.5 °F (36.9 °C)   Resp 16   Ht 5' 9\" (1.753 m)   Wt 80.3 kg (177 lb)   LMP  (Exact Date)   SpO2 95%   Breastfeeding Unknown   BMI 26.14 kg/m²     Temp (24hrs), Av °F (36.7 °C), Min:97.5 °F (36.4 °C), Max:98.5 °F (36.9 °C)      Exam:         Patient without distress. Abdomen soft, fundus firm, nontender                 Lower extremities are negative for swelling, cords or tenderness. Labs:     Lab Results   Component Value Date/Time    WBC 7.9 2020 03:51 PM    WBC 6.1 2014 05:40 PM    HGB 11.8 2020 03:51 PM    HGB 11.8 2014 05:40 PM    HCT 34.6 (L) 2020 03:51 PM    HCT 34.9 (L) 2014 05:40 PM    PLATELET 037  03:51 PM    PLATELET 283  05:40 PM       No results found for this or any previous visit (from the past 24 hour(s)).